# Patient Record
Sex: MALE | URBAN - METROPOLITAN AREA
[De-identification: names, ages, dates, MRNs, and addresses within clinical notes are randomized per-mention and may not be internally consistent; named-entity substitution may affect disease eponyms.]

---

## 2023-02-24 ENCOUNTER — OFFICE VISIT (OUTPATIENT)
Dept: URBAN - METROPOLITAN AREA CLINIC 86 | Facility: CLINIC | Age: 88
End: 2023-02-24
Payer: MEDICARE

## 2023-02-24 DIAGNOSIS — H40.9 GLAUCOMA: ICD-10-CM

## 2023-02-24 DIAGNOSIS — H34.8320 TRIBUTARY (BRANCH) RETINAL VEIN OCCLUSION, LEFT EYE, WITH MACULAR EDEMA: Primary | ICD-10-CM

## 2023-02-24 DIAGNOSIS — H35.371 PUCKERING OF MACULA, RIGHT EYE: ICD-10-CM

## 2023-02-24 DIAGNOSIS — H35.349 MACULAR HOLE: ICD-10-CM

## 2023-02-24 DIAGNOSIS — H43.811 VITREOUS DEGENERATION, RIGHT EYE: ICD-10-CM

## 2023-02-24 DIAGNOSIS — H25.13 AGE-RELATED NUCLEAR CATARACT, BILATERAL: ICD-10-CM

## 2023-02-24 PROCEDURE — 99213 OFFICE O/P EST LOW 20 MIN: CPT | Performed by: OPHTHALMOLOGY

## 2023-02-24 ASSESSMENT — INTRAOCULAR PRESSURE
OS: 18
OD: 14

## 2023-02-24 NOTE — IMPRESSION/PLAN
Impression: Age-related nuclear cataract, bilateral: H25.13. Plan: Mild. Monitor.
Impression: Glaucoma: H40.9. Plan: suspect by c/d. IOP nl yesterday with Dr REGALADO.  Follow up with Dr. Ivan Wong
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 Plan: Clinical exam and OCT demonstrate a lamellar macular hole. Lamellar holes do not generally respond favorably to vitrectomy with membrane peeling. For that reason, observation is recommended at this time.
Impression: Puckering of macula, right eye: H35.371. Plan: Examination and OCT reveals an ERM which is not distorting the macular contour. The diagnosis, natural history, and prognosis of epiretinal membranes, as well as the risks and benefits of vitrectomy with membrane peeling versus observation were discussed at length. Given the patient's current visual acuity and minimal hindrance on activities of daily living, observation was recommended at this time.
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 OCT: 02/24/2023
unable Plan: The diagnosis, natural history, and prognosis of retinal vein occlusion, were discussed. The associations with macular edema and neovascularization were also discussed. Currently, there is no iris or angle neovascularization identified on examination, and the IOP is within normal limits. Risks, benefits, and alternatives of treatment options including laser, steroids, Eylea, Lucentis and Avastin, as well as the opportunity to participate in a clinical trial were discussed at length. The patient understands that treatment may not improve vision, but should reduce the risk of further visual loss. Given stability of vision and exam, pt elects to proceed with observation RTC 3 months DFE OU OCT OU Re-eval
Impression: Vitreous degeneration, right eye: H43.811. Plan: Indirect ophthalmoscopy was performed and no retinal breaks or evidence of detachment were identified. The diagnosis, natural history, and prognosis of PVD were discussed at length. The signs and symptoms of retinal break/detachment including increased flashes, new-onset floaters, and development of a shadow/curtain shade in the visual field were reviewed.
190